# Patient Record
Sex: MALE | Race: OTHER | HISPANIC OR LATINO | Employment: UNEMPLOYED | URBAN - METROPOLITAN AREA
[De-identification: names, ages, dates, MRNs, and addresses within clinical notes are randomized per-mention and may not be internally consistent; named-entity substitution may affect disease eponyms.]

---

## 2020-01-05 ENCOUNTER — HOSPITAL ENCOUNTER (EMERGENCY)
Facility: HOSPITAL | Age: 50
Discharge: HOME | End: 2020-01-05
Attending: EMERGENCY MEDICINE

## 2020-01-05 VITALS
RESPIRATION RATE: 18 BRPM | WEIGHT: 184 LBS | OXYGEN SATURATION: 100 % | DIASTOLIC BLOOD PRESSURE: 60 MMHG | HEART RATE: 92 BPM | TEMPERATURE: 98 F | SYSTOLIC BLOOD PRESSURE: 100 MMHG

## 2020-01-05 DIAGNOSIS — R06.02 SHORTNESS OF BREATH: ICD-10-CM

## 2020-01-05 DIAGNOSIS — F10.920 ALCOHOLIC INTOXICATION WITHOUT COMPLICATION: Primary | ICD-10-CM

## 2020-01-05 DIAGNOSIS — R07.9 CHEST PAIN: ICD-10-CM

## 2020-01-05 PROCEDURE — 93010 ELECTROCARDIOGRAM REPORT: CPT | Mod: ,,, | Performed by: STUDENT IN AN ORGANIZED HEALTH CARE EDUCATION/TRAINING PROGRAM

## 2020-01-05 PROCEDURE — 25000003 PHARM REV CODE 250: Performed by: EMERGENCY MEDICINE

## 2020-01-05 PROCEDURE — 93010 EKG 12-LEAD: ICD-10-PCS | Mod: ,,, | Performed by: STUDENT IN AN ORGANIZED HEALTH CARE EDUCATION/TRAINING PROGRAM

## 2020-01-05 PROCEDURE — 93005 ELECTROCARDIOGRAM TRACING: CPT

## 2020-01-05 PROCEDURE — 99284 EMERGENCY DEPT VISIT MOD MDM: CPT | Mod: 25

## 2020-01-05 RX ORDER — IBUPROFEN 400 MG/1
400 TABLET ORAL
Status: COMPLETED | OUTPATIENT
Start: 2020-01-05 | End: 2020-01-05

## 2020-01-05 RX ADMIN — IBUPROFEN 400 MG: 400 TABLET, FILM COATED ORAL at 01:01

## 2020-01-05 NOTE — ED PROVIDER NOTES
Encounter Date: 1/5/2020    SCRIBE #1 NOTE: I, Keri Knutson, am scribing for, and in the presence of,  Dr. Baumann. I have scribed the entire note.       History     Chief Complaint   Patient presents with    Shortness of Breath     Patient presents to ED secondary to altered mental status, unresponsiveness after vomiting multiple times. Friends reports giving CPR for 5 minutes. Upon patient's arrival, patient is alert and oriented. Patient endorses alcohol intoxication. EMS reports call to residence was originally for shortness of breath. Also c/o generalized weakness.      Regi Peralta is a 49 y.o. Male who presents to the ED via EMS complaining of SOB and weakness. Pt states he has x4 beers. Per EMS pt was unresponsive after vomiting multiple times, his friends then performed CPR/chest compressions for x5 minutes. Pt now has sore like CP from the chest compressions. Pt denies having any other pain in his body. This is the first time pt has experienced this. Pt has no medical problems and takes no medication.     The history is provided by the patient.     Review of patient's allergies indicates:  No Known Allergies  History reviewed. No pertinent past medical history.  History reviewed. No pertinent surgical history.  History reviewed. No pertinent family history.  Social History     Tobacco Use    Smoking status: Former Smoker    Smokeless tobacco: Never Used   Substance Use Topics    Alcohol use: Yes    Drug use: Not Currently     Review of Systems   Constitutional: Negative for fever.   HENT: Negative for sore throat.    Respiratory: Positive for shortness of breath.    Cardiovascular: Positive for chest pain.   Gastrointestinal: Positive for nausea and vomiting.   Genitourinary: Negative for dysuria.   Musculoskeletal: Negative for back pain.   Skin: Negative for rash.   Neurological: Positive for weakness.   Hematological: Does not bruise/bleed easily.   All other systems reviewed and  are negative.      Physical Exam     Initial Vitals   BP Pulse Resp Temp SpO2   01/05/20 0027 01/05/20 0027 01/05/20 0142 01/05/20 0027 01/05/20 0027   126/78 99 18 98.1 °F (36.7 °C) 97 %      MAP       --                Physical Exam    Nursing note and vitals reviewed.  Constitutional: He appears well-developed and well-nourished.   HENT:   Head: Normocephalic and atraumatic.   Eyes: Conjunctivae are normal.   Neck: Normal range of motion. Neck supple.   Cardiovascular: Normal rate.   Pulmonary/Chest: No respiratory distress. He exhibits tenderness (Mild, sternum).   Musculoskeletal: Normal range of motion.   Neurological: He is alert and oriented to person, place, and time.   Walking with stable gait.   Skin: Skin is warm and dry.   Psychiatric: His speech is slurred.         ED Course   Procedures  Labs Reviewed - No data to display  EKG Readings: (Independently Interpreted)   Rhythm: Normal Sinus Rhythm. Heart Rate: 92.   No ST rate or T wave changes.       Imaging Results          X-Ray Chest PA And Lateral (Final result)  Result time 01/05/20 01:14:53    Final result by Radha Patton MD (01/05/20 01:14:53)                 Impression:      No acute cardiopulmonary process identified.      Electronically signed by: Radha Patton MD  Date:    01/05/2020  Time:    01:14             Narrative:    EXAMINATION:  XR CHEST PA AND LATERAL    CLINICAL HISTORY:  Shortness of breath    TECHNIQUE:  PA and lateral views of the chest were performed.    COMPARISON:  None    FINDINGS:  Cardiac silhouette is normal in size.  Lungs are symmetrically expanded.  No evidence of focal consolidative process, pneumothorax, or significant effusion.  No acute osseous abnormality identified.                                 Medical Decision Making:   Initial Assessment:   49 year old  male  No significant medical history  SOB and weakness   Onset today  PE: Slurred speech, mild tend in sternum, walking with stable gait,  AOx3  Differential Diagnosis:   Alcohol intoxication, muscle strain, muscle sprain, fracture, arhythmia  ED Management:  Obtain CXR and EKG  Patient clinically sober  Unremarkable DCM            Scribe Attestation:   Scribe #1: I performed the above scribed service and the documentation accurately describes the services I performed. I attest to the accuracy of the note.      I, Anita Baumann,  personally performed the services described in this documentation. All medical record entries made by the scribe were at my direction and in my presence.  I have reviewed the chart and agree that the record reflects my personal performance and is accurate and complete. Anita Baumann M.D           ED Course as of Jan 06 0717   Sun Jan 05, 2020   0132 X-ray EKG unremarkable patient A&O x4 walking with steady gait clinically sober well-appearing will DC home with discharge instructions and follow-up precautions.    [DC]      ED Course User Index  [DC] Anita Baumann Jr., MD                Clinical Impression:     1. Alcoholic intoxication without complication    2. Shortness of breath    3. Chest pain                                Anita Baumann Jr., MD  01/06/20 0763

## 2020-01-05 NOTE — ED NOTES
Patient presents to ED secondary to generalized weakness and chest wall tenderness. Pain is present with palpation to chest. Per patient's friend, patient had been drinking alcohol and began to vomit. Stated patient became unresponsive and they had to do CPR for 5 minutes. Patient endorses alcohol consumption tonight. Pt denies sob, URI symptoms, fever, chills, abd pain, n/v/d, urinary symptoms, and rash of any kind upon arrival. Patient also noted to have a steady gait upon arrival. NAD noted.     APPEARANCE: Alert, oriented and in no acute distress.  CARDIAC: Normal rate. Chest tender to palpation.    PERIPHERAL VASCULAR: peripheral pulses present. Normal cap refill. No edema. Warm to touch.    RESPIRATORY:Normal rate and effort, breath sounds clear bilaterally throughout chest. Respirations are equal and unlabored no obvious signs of distress.  GASTRO: soft, bowel sounds normal, no tenderness, no abdominal distention.  MUSC: Full ROM. No bony tenderness or soft tissue tenderness. No obvious deformity.  SKIN: Skin is warm and dry, normal skin turgor, mucous membranes moist.  NEURO: 5/5 strength major flexors/extensors bilaterally. Sensory intact to light touch bilaterally. [place] coma scale: eyes open spontaneously-4, oriented & converses-5, obeys commands-6. No neurological abnormalities.